# Patient Record
Sex: FEMALE | Employment: FULL TIME | URBAN - METROPOLITAN AREA
[De-identification: names, ages, dates, MRNs, and addresses within clinical notes are randomized per-mention and may not be internally consistent; named-entity substitution may affect disease eponyms.]

---

## 2017-11-22 ENCOUNTER — OFFICE VISIT (OUTPATIENT)
Dept: CARDIOLOGY CLINIC | Age: 32
End: 2017-11-22

## 2017-11-22 VITALS
HEIGHT: 67 IN | DIASTOLIC BLOOD PRESSURE: 95 MMHG | SYSTOLIC BLOOD PRESSURE: 145 MMHG | HEART RATE: 101 BPM | OXYGEN SATURATION: 100 % | BODY MASS INDEX: 21.94 KG/M2 | WEIGHT: 139.8 LBS

## 2017-11-22 DIAGNOSIS — R00.2 INTERMITTENT PALPITATIONS: ICD-10-CM

## 2017-11-22 DIAGNOSIS — G90.A POTS (POSTURAL ORTHOSTATIC TACHYCARDIA SYNDROME): ICD-10-CM

## 2017-11-22 DIAGNOSIS — R07.9 CHEST PAIN, UNSPECIFIED TYPE: ICD-10-CM

## 2017-11-22 DIAGNOSIS — R06.09 DOE (DYSPNEA ON EXERTION): ICD-10-CM

## 2017-11-22 DIAGNOSIS — R06.02 SOB (SHORTNESS OF BREATH): Primary | ICD-10-CM

## 2017-11-22 RX ORDER — MIDODRINE HYDROCHLORIDE 2.5 MG/1
TABLET ORAL DAILY
COMMUNITY

## 2017-11-22 RX ORDER — DEXTROAMPHETAMINE SACCHARATE, AMPHETAMINE ASPARTATE, DEXTROAMPHETAMINE SULFATE AND AMPHETAMINE SULFATE 5; 5; 5; 5 MG/1; MG/1; MG/1; MG/1
20 TABLET ORAL 3 TIMES DAILY
COMMUNITY

## 2017-11-22 NOTE — PROGRESS NOTES
Ms. Jean Givens is a 25-year-old female referred for assessment and management of postural orthostatic tachycardia syndrome. This was diagnosed in [facility] in 2013 but she has been dysautonomic since age 11, first discovered when she passed out while standing in [employer] and then later in a martial arts class. She had been treated in the past with Adderall for presumed ADD but it caused anxiety and palpitations and eventually was stopped when she became pregnant in 2013. A tilt table test at age 10 was strongly positive with complete syncope. It is noted in prior records that she developed severe fatigue from warm showers. She has frequent abdominal discomfort. We were given the opportunity to review a nephrology consultation from 2013 obtained in [facility]. Sometime between 2013 and 2016 she had been started on midodrine but did not continue taking it. She has been on and off Adderal during that period of time. She states that although she previously had problems with anxiety on Adderall, lately she seems to do better with it. She recently changed her contraception method from the NuvaRing to a skin patch device as listed in her medication profile. She has never been pregnant. She has not had any recent episodes of syncope but she is extremely cautious about postural change. She has not been worked up for adrenal axis abnormality to her knowledge. Her menstrual cycle is fairly regular with normal age at menarche. She does not describe any findings consistent with masculinization. She is describing exertional dyspnea which has become increasingly severe to the point where she has trouble walking quickly more than about 100 feet. She is also having episodic dyspnea at rest.  It is unclear from her history whether subjective palpitations coincide with dyspnea or not. On examination, she is 5 feet 7 inches tall, weighing 139 pounds. Body mass index is 21.90. Resting pulse is 101 and regular.   Blood pressure is 145/98. Room air oxygen saturation was not measured. Respiratory rate is 14/min and unlabored with no distress. In a seated position jugular veins are flat and they remain flat supine. Carotids are silent. There is no vertebral bruit. Lungs are clear to auscultation. Cardiac auscultation shows regular rhythm with normal quality S1 and S2 with no murmur and no gallop. Peripheral pulses are intact, there is no cyanosis or clubbing. There is no edema and there is no sign of DVT. Abdominal examination shows normal liver, nonpalpable spleen, no mass, no bruit, no pulsation. Today's EKG (supine ) is normal except for siinus tachycardia. There is otherwise no testing available for review in our system. Impression: History of POTS combined with labile hypertension    It does not appear that adrenal axis abnormality has ever been fully excluded    Long history of postural syncope    More recent history of exertional dyspnea, unclear if this is associated with arrhythmia or not    Palpitations, exact arrhythmia represented is unclear    Plan:  48 hour Holter monitor to capture both frequent palpitations and exertional dyspnea    Echocardiography to rule out obstructive hypertrophy    24 hour urine for metanephrines    No change in existing medication today    Follow-up visit as soon as these tests are available.     PMD: Maykel Powell

## 2017-11-22 NOTE — MR AVS SNAPSHOT
Visit Information Date & Time Provider Department Dept. Phone Encounter #  
 11/22/2017  1:40 PM Lizabeth Cogan, MD [facility] [phone] Upcoming Health Maintenance Date Due DTaP/Tdap/Td series (1 - Tdap) 4/1/2004 PAP AKA CERVICAL CYTOLOGY 4/1/2004 Influenza Age 5 to Adult 8/1/2017 Allergies as of 11/22/2017  Review Complete On: 11/22/2017 By: Peggy Borges No Known Allergies Current Immunizations  Never Reviewed No immunizations on file. Not reviewed this visit You Were Diagnosed With   
  
 Codes Comments SOB (shortness of breath)    -  Primary ICD-10-CM: R06.02 
ICD-9-CM: 786.05 Chest pain, unspecified type     ICD-10-CM: R07.9 ICD-9-CM: 786.50 Intermittent palpitations     ICD-10-CM: R00.2 ICD-9-CM: 785.1 POTS (postural orthostatic tachycardia syndrome)     ICD-10-CM: R00.0, I95.1 ICD-9-CM: 427.89   
 CONTEH (dyspnea on exertion)     ICD-10-CM: R06.09 
ICD-9-CM: 786.09 Vitals BP Pulse Height(growth percentile) Weight(growth percentile) SpO2 BMI  
 (!) 145/95 (!) 101 5' 7\" (1.702 m) 139 lb 12.8 oz (63.4 kg) 100% 21.9 kg/m2 Smoking Status Never Smoker Vitals History BMI and BSA Data Body Mass Index Body Surface Area  
 21.9 kg/m 2 1.73 m 2 Preferred Pharmacy Pharmacy Name Phone [address], [address] [place] [address] [phone] Your Updated Medication List  
  
   
This list is accurate as of: 11/22/17  3:53 PM.  Always use your most recent med list.  
  
  
  
  
 ADDERALL 20 mg tablet Generic drug:  dextroamphetamine-amphetamine Take 20 mg by mouth three (3) times daily. ALLEGRA PO Take  by mouth as needed. FLUDROCORTISONE PO Take  by mouth. 5 MG QD  
  
 midodrine 2.5 mg tablet Commonly known as:  Tomma Torin Take  by mouth daily.   
  
 XULANE 150-35 mcg/24 hr  
 Generic drug:  norelgestromin-ethinyl estradiol 1 Patch by TransDERmal route Every Saturday. We Performed the Following AMB POC EKG ROUTINE W/ 12 LEADS, INTER & REP [25421 CPT(R)] METANEPHRINES FRACTIONATED, URINE 24 HR [id number] CPT(R)] To-Do List   
 Around 11/22/2017 ECHO:  2D ECHO COMPLETE ADULT (TTE) W OR WO CONTR Around 11/22/2017 ECG:  ECG HOLTER MONITOR, UP TO 48 HRS Patient Instructions I have set up an echocardiogram, a 48 hour Holter monitor, and a 24 hour urine to check adrenal gland function. Please keep in touch while these tests are going on. Introducing [facility]! [employer] introduces [employer] patient portal. Now you can access parts of your medical record, email your doctor's office, and request medication refills online. 1. In your internet browser, go to [email or web address] 2. Click on the First Time User? Click Here link in the Sign In box. You will see the New Member Sign Up page. 3. Enter your [employer] Access Code exactly as it appears below. You will not need to use this code after youve completed the sign-up process. If you do not sign up before the expiration date, you must request a new code. · [employer] Access Code: [email or web address] Expires: 2/20/2018  1:57 PM 
 
4. Enter the last four digits of your Social Security Number (xxxx) and Date of Birth (mm/dd/yyyy) as indicated and click Submit. You will be taken to the next sign-up page. 5. Create a [employer] ID. This will be your [employer] login ID and cannot be changed, so think of one that is secure and easy to remember. 6. Create a [employer] password. You can change your password at any time. 7. Enter your Password Reset Question and Answer. This can be used at a later time if you forget your password. 8. Enter your e-mail address. You will receive e-mail notification when new information is available in [address] 9. Click Sign Up. You can now view and download portions of your medical record. 10. Click the Download Summary menu link to download a portable copy of your medical information. If you have questions, please visit the Frequently Asked Questions section of the [email or web address] website. Remember, [email or web address] is NOT to be used for urgent needs. For medical emergencies, dial 911. Now available from your iPhone and Android! Please provide this summary of care documentation to your next provider. Your primary care clinician is listed as Osvaldo Rodriguez. If you have any questions after today's visit, please call [phone].

## 2017-11-22 NOTE — PATIENT INSTRUCTIONS
I have set up an echocardiogram, a 48 hour Holter monitor, and a 24 hour urine to check adrenal gland function. Please keep in touch while these tests are going on.

## 2017-11-30 RX ORDER — NITROFURANTOIN 25; 75 MG/1; MG/1
CAPSULE ORAL
COMMUNITY
Start: 2017-10-21

## 2017-11-30 RX ORDER — NORELGESTROMIN AND ETHINYL ESTRADIOL 150; 35 UG/D; UG/D
PATCH TRANSDERMAL
COMMUNITY
Start: 2017-11-04

## 2017-11-30 RX ORDER — ETONOGESTREL AND ETHINYL ESTRADIOL .12; .015 MG/D; MG/D
INSERT, EXTENDED RELEASE VAGINAL
COMMUNITY
Start: 2017-08-23 | End: 2017-11-30 | Stop reason: ALTCHOICE

## 2017-11-30 RX ORDER — DEXTROAMPHETAMINE SACCHARATE, AMPHETAMINE ASPARTATE MONOHYDRATE, DEXTROAMPHETAMINE SULFATE AND AMPHETAMINE SULFATE 5; 5; 5; 5 MG/1; MG/1; MG/1; MG/1
CAPSULE, EXTENDED RELEASE ORAL
COMMUNITY
Start: 2017-11-15 | End: 2017-11-30 | Stop reason: SDUPTHER

## 2017-11-30 RX ORDER — FLUDROCORTISONE ACETATE 0.1 MG/1
TABLET ORAL
COMMUNITY
Start: 2017-10-21

## 2017-12-22 ENCOUNTER — HOSPITAL ENCOUNTER (OUTPATIENT)
Dept: NON INVASIVE DIAGNOSTICS | Age: 32
Discharge: HOME | End: 2017-12-22
Attending: INTERNAL MEDICINE
Payer: COMMERCIAL

## 2017-12-22 DIAGNOSIS — G90.A POTS (POSTURAL ORTHOSTATIC TACHYCARDIA SYNDROME): ICD-10-CM

## 2017-12-22 DIAGNOSIS — R07.9 CHEST PAIN: ICD-10-CM

## 2017-12-22 DIAGNOSIS — R00.2 INTERMITTENT PALPITATIONS: ICD-10-CM

## 2017-12-22 DIAGNOSIS — R06.02 SOB (SHORTNESS OF BREATH): ICD-10-CM

## 2017-12-22 DIAGNOSIS — R07.9 CHEST PAIN, UNSPECIFIED TYPE: ICD-10-CM

## 2017-12-22 DIAGNOSIS — R06.09 DOE (DYSPNEA ON EXERTION): ICD-10-CM

## 2017-12-22 PROCEDURE — 93225 XTRNL ECG REC<48 HRS REC: CPT

## 2017-12-22 PROCEDURE — 93306 TTE W/DOPPLER COMPLETE: CPT

## 2017-12-22 NOTE — PROGRESS NOTES
48 hour holter placed to be removed on 12/24/17 and returned on 12/26/2017 instructions to patient and extra supplies given.

## 2023-09-08 ENCOUNTER — IMMUNIZATION (OUTPATIENT)
Age: 38
End: 2023-09-08

## 2023-09-08 DIAGNOSIS — Z23 ENCOUNTER FOR IMMUNIZATION: Primary | ICD-10-CM

## 2023-09-11 NOTE — PROGRESS NOTES
Patient requests flu vaccine; consent form obtained; denies fever, egg allergy nor past reactions to any injection or immunization. Immunization given per protocol and recorded in [address]. VIS information sheet given, explained possible S/E. Reviewed sx indicating need to be seen in ER. Pt had no adverse reaction at time of discharge.     Given by ABA Mcadams RN

## 2024-11-12 ENCOUNTER — HOSPITAL ENCOUNTER (OUTPATIENT)
Facility: HOSPITAL | Age: 39
Discharge: HOME | End: 2024-11-15
Payer: COMMERCIAL

## 2024-11-12 DIAGNOSIS — J40 BRONCHITIS: ICD-10-CM

## 2024-11-12 PROCEDURE — 71046 X-RAY EXAM CHEST 2 VIEWS: CPT
